# Patient Record
Sex: MALE | Race: WHITE | NOT HISPANIC OR LATINO
[De-identification: names, ages, dates, MRNs, and addresses within clinical notes are randomized per-mention and may not be internally consistent; named-entity substitution may affect disease eponyms.]

---

## 2021-01-12 ENCOUNTER — TRANSCRIPTION ENCOUNTER (OUTPATIENT)
Age: 51
End: 2021-01-12

## 2022-01-04 ENCOUNTER — APPOINTMENT (OUTPATIENT)
Dept: ORTHOPEDIC SURGERY | Facility: CLINIC | Age: 52
End: 2022-01-04

## 2022-01-04 PROBLEM — Z00.00 ENCOUNTER FOR PREVENTIVE HEALTH EXAMINATION: Status: ACTIVE | Noted: 2022-01-04

## 2022-04-08 ENCOUNTER — APPOINTMENT (OUTPATIENT)
Dept: ORTHOPEDIC SURGERY | Facility: CLINIC | Age: 52
End: 2022-04-08
Payer: COMMERCIAL

## 2022-04-08 DIAGNOSIS — M23.92 UNSPECIFIED INTERNAL DERANGEMENT OF LEFT KNEE: ICD-10-CM

## 2022-04-08 DIAGNOSIS — Z86.39 PERSONAL HISTORY OF OTHER ENDOCRINE, NUTRITIONAL AND METABOLIC DISEASE: ICD-10-CM

## 2022-04-08 DIAGNOSIS — Z87.891 PERSONAL HISTORY OF NICOTINE DEPENDENCE: ICD-10-CM

## 2022-04-08 DIAGNOSIS — Z78.9 OTHER SPECIFIED HEALTH STATUS: ICD-10-CM

## 2022-04-08 PROCEDURE — 73562 X-RAY EXAM OF KNEE 3: CPT | Mod: LT

## 2022-04-08 PROCEDURE — 99204 OFFICE O/P NEW MOD 45 MIN: CPT

## 2022-04-08 RX ORDER — ROSUVASTATIN CALCIUM 5 MG/1
TABLET, FILM COATED ORAL
Refills: 0 | Status: ACTIVE | COMMUNITY

## 2022-04-08 NOTE — HISTORY OF PRESENT ILLNESS
[Pain Location] : pain [2] : a current pain level of 2/10 [8] : a maximum pain level of 8/10 [Bending] : worsened by bending [Walking] : worsened by walking [Ice] : relieved by ice [Rest] : relieved by rest [de-identified] : 50 y/o M presents with left knee pain which started in December when he was ice skating with his daughter. He twisted the knee and fell. He had a lot of swelling in the medial knee. His pain is now in the lateral knee. The pain is sharp. He is no longer able to exercise as much as he did because of his pain. He states, "I can only do about a 10 pound leg press." He feels weakness in the knee. He has no locking.

## 2022-04-08 NOTE — DISCUSSION/SUMMARY
[de-identified] : 50 y/o M with left knee pain which started in December after twisting the knee while ice skating. His xrays showed well preserved joint spaces. His symptoms have been persistent for several months without much improvement. He is limited with his activities because of his pain. He has an effusion which indicates some form on inflammatory response. He should continue to do light activity. He will get a left knee MRI to rule out a bucket handle meniscus tear.

## 2022-04-08 NOTE — PHYSICAL EXAM
[LE] : Sensory: Intact in bilateral lower extremities [ALL] : dorsalis pedis, posterior tibial, femoral, popliteal, and radial 2+ and symmetric bilaterally [Normal] : Alert and in no acute distress [Poor Appearance] : well-appearing [de-identified] : GENERAL APPEARANCE: Well nourished and hydrated, pleasant, alert, and oriented x 3. Appears their stated age. \par HEENT: Normocephalic, extraocular eye motion intact. Nasal septum midline. Oral cavity clear. External auditory canal clear. \par RESPIRATORY: Breath sounds clear and audible in all lobes. No wheezing, No accessory muscle use.\par CARDIOVASCULAR: No apparent abnormalities. No lower leg edema. No varicosities. Pedal pulses are palpable.\par NEUROLOGIC: Sensation is normal, no muscle weakness in the upper or lower extremities.\par DERMATOLOGIC: No apparent skin lesions, moist, warm, no rash.\par SPINE: Cervical spine appears normal and moves freely; thoracic spine appears normal and moves freely; lumbosacral spine appears normal and moves freely, normal, nontender.\par MUSCULOSKELETAL: Hands, wrists, and elbows are normal and move freely, shoulders are normal and move freely.  [de-identified] : left knee exam show effusion, negative Lochmans, ROM 0-90 degrees\par lateral knee pain with internal rotation of the hip and with Stinchfield maneuver [de-identified] : 3V xray of the left knee done in the office today and reviewed by Dr. Savage Vazquez demonstrates well preserved joint spaces and neutral alignment with no evidence of fractures, dislocations, or pathological lesions.

## 2022-04-08 NOTE — END OF VISIT
[FreeTextEntry3] : I, Abelardo Cameron, acted solely as a scribe for Dr. Savage Vazquez on this date 04/08/2022.

## 2022-05-11 ENCOUNTER — APPOINTMENT (OUTPATIENT)
Dept: ORTHOPEDIC SURGERY | Facility: CLINIC | Age: 52
End: 2022-05-11
Payer: COMMERCIAL

## 2022-05-11 VITALS
DIASTOLIC BLOOD PRESSURE: 82 MMHG | SYSTOLIC BLOOD PRESSURE: 127 MMHG | HEART RATE: 85 BPM | BODY MASS INDEX: 29.39 KG/M2 | WEIGHT: 217 LBS | HEIGHT: 72 IN

## 2022-05-11 PROCEDURE — 99214 OFFICE O/P EST MOD 30 MIN: CPT

## 2022-05-11 NOTE — HISTORY OF PRESENT ILLNESS
[Pain Location] : pain [2] : a current pain level of 2/10 [8] : a maximum pain level of 8/10 [Bending] : worsened by bending [Walking] : worsened by walking [Ice] : relieved by ice [Rest] : relieved by rest [de-identified] : 52 y/o M presents with left knee pain which started in December when he was ice skating with his daughter. He twisted the knee and fell. He had a lot of swelling in the medial knee. His pain is now in the lateral knee. The pain is sharp. He is no longer able to exercise as much as he did because of his pain. He states, "I can only do about a 10 pound leg press." He feels weakness in the knee. He has no locking. He had a left knee MRI on 4/28/2022 and is here today to review the MRI.\par \par his pain is mild. he denies trouble with walking\par but he had buckling , he c/o persistent weakness. difficulty with stairs. \par \par He had a second episode of instability about a week ago\par

## 2022-05-11 NOTE — END OF VISIT
[FreeTextEntry3] : I, Abelardo Cameron, acted solely as a scribe for Dr. Savage Vazquez on this date 05/11/2022.

## 2022-05-11 NOTE — PHYSICAL EXAM
[LE] : Sensory: Intact in bilateral lower extremities [ALL] : dorsalis pedis, posterior tibial, femoral, popliteal, and radial 2+ and symmetric bilaterally [Normal] : Alert and in no acute distress [Poor Appearance] : well-appearing [de-identified] : GENERAL APPEARANCE: Well nourished and hydrated, pleasant, alert, and oriented x 3. Appears their stated age. \par HEENT: Normocephalic, extraocular eye motion intact. Nasal septum midline. Oral cavity clear. External auditory canal clear. \par RESPIRATORY: Breath sounds clear and audible in all lobes. No wheezing, No accessory muscle use.\par CARDIOVASCULAR: No apparent abnormalities. No lower leg edema. No varicosities. Pedal pulses are palpable.\par NEUROLOGIC: Sensation is normal, no muscle weakness in the upper or lower extremities.\par DERMATOLOGIC: No apparent skin lesions, moist, warm, no rash.\par SPINE: Cervical spine appears normal and moves freely; thoracic spine appears normal and moves freely; lumbosacral spine appears normal and moves freely, normal, nontender.\par MUSCULOSKELETAL: Hands, wrists, and elbows are normal and move freely, shoulders are normal and move freely.  [de-identified] : left knee exam show effusion, positive Lachmans, ROM 0-120 degrees\par lateral knee pain with internal rotation of the hip and with Stinchfield maneuver [de-identified] : MRI of the left knee performed at [facility] on 4/28/2022 showed the following:\par 1. Anterior cruciate ligament disruption.\par 2. Medial collateral ligament sprain versus partial tear.\par 3. Free edge tear at the junction of the anterior horn and body of the lateral meniscus.\par 4. Depression of the subchondral bone plate with subjacent marrow edema involving the central lateral femoral condyle compatible with an impaction injury.\par 5. Full-thickness cartilage heterogeneity involving the posterior medial femoral condyle with subadjacent marrow edema.

## 2022-08-01 ENCOUNTER — OUTPATIENT (OUTPATIENT)
Dept: OUTPATIENT SERVICES | Facility: HOSPITAL | Age: 52
LOS: 1 days | End: 2022-08-01
Payer: COMMERCIAL

## 2022-08-01 DIAGNOSIS — Z01.818 ENCOUNTER FOR OTHER PREPROCEDURAL EXAMINATION: ICD-10-CM

## 2022-08-01 LAB
A1C WITH ESTIMATED AVERAGE GLUCOSE RESULT: 6.2 % — HIGH (ref 4–5.6)
ANION GAP SERPL CALC-SCNC: 10 MMOL/L — SIGNIFICANT CHANGE UP (ref 5–17)
APTT BLD: 31.4 SEC — SIGNIFICANT CHANGE UP (ref 27.5–35.5)
BASOPHILS # BLD AUTO: 0.07 K/UL — SIGNIFICANT CHANGE UP (ref 0–0.2)
BASOPHILS NFR BLD AUTO: 0.9 % — SIGNIFICANT CHANGE UP (ref 0–2)
BUN SERPL-MCNC: 13.3 MG/DL — SIGNIFICANT CHANGE UP (ref 8–20)
CALCIUM SERPL-MCNC: 9.4 MG/DL — SIGNIFICANT CHANGE UP (ref 8.4–10.5)
CHLORIDE SERPL-SCNC: 102 MMOL/L — SIGNIFICANT CHANGE UP (ref 98–107)
CO2 SERPL-SCNC: 29 MMOL/L — SIGNIFICANT CHANGE UP (ref 22–29)
CREAT SERPL-MCNC: 0.82 MG/DL — SIGNIFICANT CHANGE UP (ref 0.5–1.3)
EGFR: 106 ML/MIN/1.73M2 — SIGNIFICANT CHANGE UP
EOSINOPHIL # BLD AUTO: 0.09 K/UL — SIGNIFICANT CHANGE UP (ref 0–0.5)
EOSINOPHIL NFR BLD AUTO: 1.1 % — SIGNIFICANT CHANGE UP (ref 0–6)
ESTIMATED AVERAGE GLUCOSE: 131 MG/DL — HIGH (ref 68–114)
GLUCOSE SERPL-MCNC: 114 MG/DL — HIGH (ref 70–99)
HCT VFR BLD CALC: 46.1 % — SIGNIFICANT CHANGE UP (ref 39–50)
HGB BLD-MCNC: 15.8 G/DL — SIGNIFICANT CHANGE UP (ref 13–17)
IMM GRANULOCYTES NFR BLD AUTO: 0.4 % — SIGNIFICANT CHANGE UP (ref 0–1.5)
INR BLD: 0.92 RATIO — SIGNIFICANT CHANGE UP (ref 0.88–1.16)
LYMPHOCYTES # BLD AUTO: 1.26 K/UL — SIGNIFICANT CHANGE UP (ref 1–3.3)
LYMPHOCYTES # BLD AUTO: 15.9 % — SIGNIFICANT CHANGE UP (ref 13–44)
MCHC RBC-ENTMCNC: 29.3 PG — SIGNIFICANT CHANGE UP (ref 27–34)
MCHC RBC-ENTMCNC: 34.3 GM/DL — SIGNIFICANT CHANGE UP (ref 32–36)
MCV RBC AUTO: 85.5 FL — SIGNIFICANT CHANGE UP (ref 80–100)
MONOCYTES # BLD AUTO: 0.46 K/UL — SIGNIFICANT CHANGE UP (ref 0–0.9)
MONOCYTES NFR BLD AUTO: 5.8 % — SIGNIFICANT CHANGE UP (ref 2–14)
NEUTROPHILS # BLD AUTO: 6 K/UL — SIGNIFICANT CHANGE UP (ref 1.8–7.4)
NEUTROPHILS NFR BLD AUTO: 75.9 % — SIGNIFICANT CHANGE UP (ref 43–77)
PLATELET # BLD AUTO: 267 K/UL — SIGNIFICANT CHANGE UP (ref 150–400)
POTASSIUM SERPL-MCNC: 4.7 MMOL/L — SIGNIFICANT CHANGE UP (ref 3.5–5.3)
POTASSIUM SERPL-SCNC: 4.7 MMOL/L — SIGNIFICANT CHANGE UP (ref 3.5–5.3)
PROTHROM AB SERPL-ACNC: 10.7 SEC — SIGNIFICANT CHANGE UP (ref 10.5–13.4)
RBC # BLD: 5.39 M/UL — SIGNIFICANT CHANGE UP (ref 4.2–5.8)
RBC # FLD: 12 % — SIGNIFICANT CHANGE UP (ref 10.3–14.5)
SODIUM SERPL-SCNC: 140 MMOL/L — SIGNIFICANT CHANGE UP (ref 135–145)
WBC # BLD: 7.91 K/UL — SIGNIFICANT CHANGE UP (ref 3.8–10.5)
WBC # FLD AUTO: 7.91 K/UL — SIGNIFICANT CHANGE UP (ref 3.8–10.5)

## 2022-08-01 PROCEDURE — 85025 COMPLETE CBC W/AUTO DIFF WBC: CPT

## 2022-08-01 PROCEDURE — 83036 HEMOGLOBIN GLYCOSYLATED A1C: CPT

## 2022-08-01 PROCEDURE — 36415 COLL VENOUS BLD VENIPUNCTURE: CPT

## 2022-08-01 PROCEDURE — 85610 PROTHROMBIN TIME: CPT

## 2022-08-01 PROCEDURE — G0463: CPT

## 2022-08-01 PROCEDURE — 93010 ELECTROCARDIOGRAM REPORT: CPT

## 2022-08-01 PROCEDURE — 80048 BASIC METABOLIC PNL TOTAL CA: CPT

## 2022-08-01 PROCEDURE — 85730 THROMBOPLASTIN TIME PARTIAL: CPT

## 2022-08-01 PROCEDURE — 93005 ELECTROCARDIOGRAM TRACING: CPT

## 2022-08-23 ENCOUNTER — APPOINTMENT (OUTPATIENT)
Dept: ORTHOPEDIC SURGERY | Facility: AMBULATORY SURGERY CENTER | Age: 52
End: 2022-08-23

## 2022-08-23 PROCEDURE — 29888 ARTHRS AID ACL RPR/AGMNTJ: CPT | Mod: LT

## 2022-08-23 RX ORDER — OXYCODONE 5 MG/1
5 TABLET ORAL
Qty: 30 | Refills: 0 | Status: ACTIVE | COMMUNITY
Start: 2022-08-23 | End: 1900-01-01

## 2022-08-23 RX ORDER — ASPIRIN 325 MG/1
325 TABLET, FILM COATED ORAL DAILY
Qty: 14 | Refills: 0 | Status: ACTIVE | COMMUNITY
Start: 2022-08-23 | End: 1900-01-01

## 2022-09-06 ENCOUNTER — APPOINTMENT (OUTPATIENT)
Dept: ORTHOPEDIC SURGERY | Facility: CLINIC | Age: 52
End: 2022-09-06

## 2022-09-06 PROCEDURE — 99024 POSTOP FOLLOW-UP VISIT: CPT

## 2022-09-06 NOTE — HISTORY OF PRESENT ILLNESS
[Knee Pain] : Knee Pain [de-identified] : Patient is a 52-year-old male status post left knee ACL reconstruction.  He states he is doing very well and has been compliant with his postoperative restrictions in the brace and using crutches.  He had a fall in the PACU  immediately after surgery that that the brace however he has been doing well since and has been compliant with his restrictions since.  He has been doing physical therapy and notes improvement in his symptoms.  He denies any fevers, chills, shortness of breath.  He denies any numbness or tingling distally. [de-identified] : On exam, the patient is pleasant.  They  are awake, alert, and oriented x3.  The patient presents today with  A Hitchcock brace and on crutches.   Weight is appropriate for height\par Full range of motion of cervical spine without instability\par Full range of motion of back without instability\par Intact neurologic, vascular, and dermatologic exam to right and left upper extremities\par Intact neurologic, vascular, and dermatologic exam to right and left lower extremities\par \par Right lower Extremity\par The patient's incisions are well-healed. No erythema, warmth, or drainage. There is mild postoperative effusion.  No bony tenderness to palpation about the knee. Range of motion: 0-80 degrees.  Negative Eugene test, anterior drawer negative, Posterior Drawer negative, Lachman negative. No varus or valgus laxity at 0 or 30 degrees of knee flexion.  EHL/FHL/TA/GSC motor function is intact, sensations intact light touch in L2-S1 distributions, DP/PT pulses are palpable, compartments are soft and compressible, there is no calf tenderness to palpation bilaterally. [de-identified] : Patient is a 52-year-old male status post left knee ACL reconstruction with BTB allograft. [de-identified] : Patient is recovering well from his recent surgery.  He has had improvements in his pain, swelling, and range of motion since the day of surgery.  At this time he may continue to weight-bear as tolerated in the knee brace and using crutches with physical therapy.  He will continue to follow the postoperative protocol previously provided to him with physical therapy and continue to progress activity with physical therapy following the postoperative protocol.  He can perform full range of motion as tolerated.  He was provided a new physical therapy prescription today and he states he has a protocol at home.  He will avoid any running, jumping, high-impact and exacerbating activities.  He will take over-the-counter medication as needed for pain and inflammation.  He will follow-up in 4 weeks for repeat for repeat evaluation to reassess.  He can perform normal hygiene at this time but will avoid submerging the wound underwater for 1 more week.  Patient understands and agrees and all questions were answered.

## 2022-10-04 ENCOUNTER — APPOINTMENT (OUTPATIENT)
Dept: ORTHOPEDIC SURGERY | Facility: CLINIC | Age: 52
End: 2022-10-04

## 2022-10-04 DIAGNOSIS — M25.569 PAIN IN UNSPECIFIED KNEE: ICD-10-CM

## 2022-10-04 PROCEDURE — 99024 POSTOP FOLLOW-UP VISIT: CPT

## 2022-10-04 NOTE — HISTORY OF PRESENT ILLNESS
[___ Weeks Post Op] : [unfilled] weeks post op [de-identified] : Status post left knee ACL reconstruction with BTB allograft on 8/23/2022 [de-identified] : Savage is a 52-year-old male returns to the office today status post left knee ACL reconstruction.  The patient states he is doing well and has minimal to no pain at this point.  He has been compliant with his postoperative restrictions.  He has been ambulating with his Harrison brace unlocked.  Has been going to physical therapy twice per week working on his range of motion.  So far he is pleased with his progress.  He did have a fall in the recovery area after the surgery.  Since then he does not report any recurrence of instability.  He returns for routine follow-up.  The patient denies any fevers, chills, sweats, recent illnesses, numbness, tingling, weakness, or pain elsewhere at this time. [de-identified] : On exam, the patient is pleasant.  They  are awake, alert, and oriented x3.  The patient walks with his Ward brace on his operative extremity.\par Weight is appropriate for height\par Full range of motion of cervical spine without instability\par Full range of motion of back without instability\par Intact neurologic, vascular, and dermatologic exam to right and left upper extremities\par Intact neurologic, vascular, and dermatologic exam to right and left lower extremities\par \par Left lower Extremity\par Patient's incisions are well-healed.  No erythema, warmth, or drainage. There is a minimal postoperative effusion.  No bony tenderness to palpation about the knee. Range of motion: 0 to 125 degrees.  Anterior drawer negative, Posterior Drawer negative, Lachman negative. No varus or valgus laxity at 0 or 30 degrees of knee flexion.  EHL/FHL/TA/GSC motor function is intact, sensations intact light touch in L2-S1 distributions, DP/PT pulses are palpable, compartments are soft and compressible, there is no calf tenderness to palpation bilaterally. [de-identified] : 52-year-old male status post left knee ACL reconstruction with BTB allograft on 8/23/2022 [de-identified] : Savage is recovering well from his recent surgery.  He has had improvements in his pain, swelling, and range of motion since his last visit.  At this point he may discontinue the use of his brace and ambulate as tolerated.  He will avoid any running, jumping, twisting, cutting, or pivoting on the knee as this could lead to reinjury.  He will continue with physical therapy to optimize his strength and range of motion as per our postoperative protocol.  He was given a referral for physical therapy today.  Recommend follow-up in 6 weeks for repeat evaluation.  He verbalizes understanding and agrees with the plan.  All questions were answered his satisfaction

## 2022-11-17 ENCOUNTER — APPOINTMENT (OUTPATIENT)
Dept: ORTHOPEDIC SURGERY | Facility: CLINIC | Age: 52
End: 2022-11-17

## 2022-11-17 PROCEDURE — 99024 POSTOP FOLLOW-UP VISIT: CPT

## 2022-11-17 NOTE — HISTORY OF PRESENT ILLNESS
[de-identified] : Status post left knee ACL reconstruction with BTB allograft on 8/23/2022 [de-identified] : Savage is a 52-year-old male returns to the office today status post left knee ACL reconstruction.  The patient states he is doing very well and has no pain or swelling.  He is full range of motion and has been going to physical therapy.  He has been compliant with his postoperative restrictions.  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  Otherwise he is very pleased with his progress and returns today for routine assessment.  The patient denies any fevers, chills, sweats, recent illnesses, numbness, tingling, weakness, or pain elsewhere at this time. [de-identified] : On exam, the patient is pleasant.  They  are awake, alert, and oriented x3.  Patient ambulates without an antalgic gait.\par Weight is appropriate for height\par Full range of motion of cervical spine without instability\par Full range of motion of back without instability\par Intact neurologic, vascular, and dermatologic exam to right and left upper extremities\par Intact neurologic, vascular, and dermatologic exam to right and left lower extremities\par \par Left lower Extremity\par The patient's incisions are well-healed.  No erythema, warmth, or drainage. There is no effusion.  No bony tenderness to palpation about the knee. Range of motion: 0 to 130 degrees.  Anterior drawer negative, Posterior Drawer negative, Lachman negative. No varus or valgus laxity at 0 or 30 degrees of knee flexion.  EHL/FHL/TA/GSC motor function is intact, sensations intact light touch in L2-S1 distributions, DP/PT pulses are palpable, compartments are soft and compressible, there is no calf tenderness to palpation bilaterally.  Quadriceps atrophy is appreciated on the left side compared to the right. [de-identified] : 52-year-old male status post left knee ACL reconstruction with BTB allograft on 8/23/2022 [de-identified] : Savage is recovering well from his recent surgery.  He has full range of motion without any pain or swelling.  His ACL is stable to examination today.  He is 3 months out from surgery.  At this point he will continue with physical therapy to them for more progressive strengthening and plan metrics exercises targeting his quadriceps muscles.  He will avoid any sports, cutting, pivoting, or twisting on the knee.  He will avoid running at this point.  Recommend follow-up in 3 months for repeat clinical assessment.  He verbalizes understanding and agrees with the plan.  All questions were answered to his satisfaction.

## 2022-12-17 ENCOUNTER — NON-APPOINTMENT (OUTPATIENT)
Age: 52
End: 2022-12-17

## 2023-03-01 ENCOUNTER — APPOINTMENT (OUTPATIENT)
Dept: ORTHOPEDIC SURGERY | Facility: CLINIC | Age: 53
End: 2023-03-01
Payer: COMMERCIAL

## 2023-03-01 VITALS
SYSTOLIC BLOOD PRESSURE: 151 MMHG | WEIGHT: 217 LBS | DIASTOLIC BLOOD PRESSURE: 90 MMHG | HEART RATE: 91 BPM | HEIGHT: 72 IN | BODY MASS INDEX: 29.39 KG/M2

## 2023-03-01 DIAGNOSIS — S83.512A SPRAIN OF ANTERIOR CRUCIATE LIGAMENT OF LEFT KNEE, INITIAL ENCOUNTER: ICD-10-CM

## 2023-03-01 PROCEDURE — 99213 OFFICE O/P EST LOW 20 MIN: CPT

## 2023-05-20 ENCOUNTER — NON-APPOINTMENT (OUTPATIENT)
Age: 53
End: 2023-05-20

## 2025-05-23 ENCOUNTER — APPOINTMENT (OUTPATIENT)
Dept: ORTHOPEDIC SURGERY | Facility: CLINIC | Age: 55
End: 2025-05-23
Payer: COMMERCIAL

## 2025-05-23 VITALS — WEIGHT: 220 LBS | BODY MASS INDEX: 29.8 KG/M2 | HEIGHT: 72 IN

## 2025-05-23 DIAGNOSIS — G56.02 CARPAL TUNNEL SYNDROME, LEFT UPPER LIMB: ICD-10-CM

## 2025-05-23 PROCEDURE — 99203 OFFICE O/P NEW LOW 30 MIN: CPT

## 2025-05-23 RX ORDER — METHYLPREDNISOLONE 4 MG/1
4 TABLET ORAL
Qty: 1 | Refills: 0 | Status: ACTIVE | COMMUNITY
Start: 2025-05-23 | End: 1900-01-01

## 2025-06-20 ENCOUNTER — APPOINTMENT (OUTPATIENT)
Dept: ORTHOPEDIC SURGERY | Facility: CLINIC | Age: 55
End: 2025-06-20